# Patient Record
Sex: MALE
[De-identification: names, ages, dates, MRNs, and addresses within clinical notes are randomized per-mention and may not be internally consistent; named-entity substitution may affect disease eponyms.]

---

## 2019-01-16 PROBLEM — Z00.00 ENCOUNTER FOR PREVENTIVE HEALTH EXAMINATION: Status: ACTIVE | Noted: 2019-01-16

## 2019-01-25 ENCOUNTER — APPOINTMENT (OUTPATIENT)
Dept: PULMONOLOGY | Facility: CLINIC | Age: 59
End: 2019-01-25
Payer: COMMERCIAL

## 2019-01-25 VITALS
SYSTOLIC BLOOD PRESSURE: 120 MMHG | BODY MASS INDEX: 30.35 KG/M2 | DIASTOLIC BLOOD PRESSURE: 64 MMHG | TEMPERATURE: 97.8 F | HEIGHT: 70 IN | HEART RATE: 76 BPM | OXYGEN SATURATION: 97 % | WEIGHT: 212 LBS

## 2019-01-25 DIAGNOSIS — Z82.49 FAMILY HISTORY OF ISCHEMIC HEART DISEASE AND OTHER DISEASES OF THE CIRCULATORY SYSTEM: ICD-10-CM

## 2019-01-25 DIAGNOSIS — R05 COUGH: ICD-10-CM

## 2019-01-25 DIAGNOSIS — J45.909 UNSPECIFIED ASTHMA, UNCOMPLICATED: ICD-10-CM

## 2019-01-25 PROCEDURE — 99244 OFF/OP CNSLTJ NEW/EST MOD 40: CPT

## 2019-01-27 PROBLEM — Z82.49 FAMILY HISTORY OF CONGESTIVE HEART FAILURE: Status: ACTIVE | Noted: 2019-01-25

## 2019-01-27 PROBLEM — J45.909 CHILDHOOD ASTHMA: Status: RESOLVED | Noted: 2019-01-25 | Resolved: 2019-01-27

## 2019-01-27 NOTE — REVIEW OF SYSTEMS
[Postnasal Drip] : postnasal drip [Cough] : cough [Reflux] : reflux [Snoring] : snoring [Fever] : no fever [Chills] : no chills [Dry Eyes] : no dryness of the eyes [Eye Irritation] : no ~T irritation of the eyes [Nasal Congestion] : no nasal congestion [Sinus Problems] : no sinus problems [Sore Throat] : no sore throat [Sputum] : not coughing up ~M sputum [Hemoptysis] : no hemoptysis [Dyspnea] : no dyspnea [Chest Tightness] : no chest tightness [Pleuritic Pain] : no pleuritic pain [Frequent URIs] : no frequent upper respiratory infections [Wheezing] : no wheezing [Hay Fever] : no hay fever [Itchy Eyes] : no itching of ~T the eyes [Watery Eyes] : no discharge from the eyes [Nasal Discharge] : no nasal discharge [Heartburn] : no heartburn [Indigestion] : no indigestion [Nocturia] : no nocturia [Frequency] : no change in urinary frequency [Trauma] : no ~T physical trauma [Fracture] : no fracture [Rash] : no [unfilled] rash [Itch] : no itching [Anemia] : no anemia [Blood Transfusion] : no blood transfusion [Headache] : no headache [Dizziness] : no dizziness [Syncope] : no fainting [Depression] : no depression [Anxiety] : no anxiety [Diabetes] : no diabetes mellitus [Thyroid Problem] : no thyroid problem [Witnessed Apneas] : demonstrated no ~M apnea [Awakes With Headache] : awakes without a headache [Awakes With Dry Mouth] : awakes without dry mouth

## 2019-01-27 NOTE — CONSULT LETTER
[Dear  ___] : Dear  [unfilled], [Consult Letter:] : I had the pleasure of evaluating your patient, [unfilled]. [Please see my note below.] : Please see my note below. [Consult Closing:] : Thank you very much for allowing me to participate in the care of this patient.  If you have any questions, please do not hesitate to contact me. [FreeTextEntry3] : Sincerely\par \par Armando Rivers MD FCCP\par Pulmonary and Critical Care\par [facility]\par

## 2019-01-27 NOTE — PHYSICAL EXAM
[General Appearance - Well Developed] : well developed [General Appearance - Well Nourished] : well nourished [Normal Conjunctiva] : the conjunctiva exhibited no abnormalities [Eyelids - No Xanthelasma] : the eyelids demonstrated no xanthelasmas [Erythema] : erythema of the pharynx [Neck Cervical Mass (___cm)] : no neck mass was observed [Jugular Venous Distention Increased] : there was no jugular-venous distention [Heart Sounds] : normal S1 and S2 [Arterial Pulses Normal] : the arterial pulses were normal [Respiration, Rhythm And Depth] : normal respiratory rhythm and effort [Auscultation Breath Sounds / Voice Sounds] : lungs were clear to auscultation bilaterally [Bowel Sounds] : normal bowel sounds [Abdomen Soft] : soft [Abnormal Walk] : normal gait [Gait - Sufficient For Exercise Testing] : the gait was sufficient for exercise testing [Nail Clubbing] : no clubbing of the fingernails [Cyanosis, Localized] : no localized cyanosis [] : no rash [Skin Lesions] : no skin lesions [Sensation] : the sensory exam was normal to light touch and pinprick [No Focal Deficits] : no focal deficits [Oriented To Time, Place, And Person] : oriented to person, place, and time [Mood] : the mood was normal [Low Lying Soft Palate] : no low lying soft palate

## 2019-01-27 NOTE — DISCUSSION/SUMMARY
[FreeTextEntry1] : 58 year old male with history of childhood asthma and mild intermittent asthma at present referred  to clinic by Dr. Apolonia Philippe with cough for 2 months.\par \par Saturation 97% on RA\par \par A/P\par History is suggestive of dry cough secondary to asthma. Patient also has GERD symptoms along with rhinitis. His symptoms improved but still persisting.\par Plan:\par - Continue Symbicort 2 puff bid with gargle\par - He took Singulair in past but currently, he is not using it. I restarted Singulair for 1 month.\par - Add Flonase\par - Continue omeprazole\par - Follow up after 4 weeks.

## 2019-01-27 NOTE — HISTORY OF PRESENT ILLNESS
[FreeTextEntry1] : 58 year old male with history of childhood asthma and mild intermittent asthma at present referred  to clinic by Dr. Apolonia Philippe with cough for 2 months. Patient started to have cough 2 month back, which is persistent, predominantly dry, increased with exposure to cold air. Improved after using omeprazole and Symbicort for last few days but still persisting. Patient c/o occasional symptoms of GERD and wheezing. Denies fever, sore throat, chest pain or SOB. According to patient, CXR was done by Dr. Philippe and it was clear. He denies any sick contact or travel outside country. No Pets at home. He works in restaurant business.

## 2019-03-01 ENCOUNTER — APPOINTMENT (OUTPATIENT)
Dept: PULMONOLOGY | Facility: CLINIC | Age: 59
End: 2019-03-01
Payer: COMMERCIAL

## 2019-03-01 VITALS
HEART RATE: 73 BPM | WEIGHT: 203 LBS | SYSTOLIC BLOOD PRESSURE: 110 MMHG | BODY MASS INDEX: 29.06 KG/M2 | HEIGHT: 70 IN | OXYGEN SATURATION: 96 % | TEMPERATURE: 97.4 F | DIASTOLIC BLOOD PRESSURE: 80 MMHG

## 2019-03-01 PROCEDURE — 99214 OFFICE O/P EST MOD 30 MIN: CPT

## 2019-03-01 RX ORDER — OMEPRAZOLE 40 MG/1
40 CAPSULE, DELAYED RELEASE ORAL
Refills: 0 | Status: DISCONTINUED | COMMUNITY
End: 2019-03-01

## 2019-03-01 RX ORDER — BUDESONIDE AND FORMOTEROL FUMARATE DIHYDRATE 160; 4.5 UG/1; UG/1
160-4.5 AEROSOL RESPIRATORY (INHALATION)
Refills: 0 | Status: DISCONTINUED | COMMUNITY
End: 2019-03-01

## 2019-03-01 RX ORDER — SODIUM BICARBONATE, SODIUM CHLORIDE 700; 2300 MG/3G; MG/3G
2300-700 POWDER, FOR SOLUTION NASAL
Qty: 1 | Refills: 0 | Status: ACTIVE | COMMUNITY
Start: 2019-03-01 | End: 1900-01-01

## 2019-03-01 NOTE — REVIEW OF SYSTEMS
[Postnasal Drip] : postnasal drip [Cough] : cough [Reflux] : reflux [Snoring] : snoring [Fever] : no fever [Chills] : no chills [Dry Eyes] : no dryness of the eyes [Eye Irritation] : no ~T irritation of the eyes [Nasal Congestion] : no nasal congestion [Sinus Problems] : no sinus problems [Sore Throat] : no sore throat [Sputum] : not coughing up ~M sputum [Hemoptysis] : no hemoptysis [Dyspnea] : no dyspnea [Chest Tightness] : no chest tightness [Pleuritic Pain] : no pleuritic pain [Frequent URIs] : no frequent upper respiratory infections [Wheezing] : no wheezing [Hay Fever] : no hay fever [Itchy Eyes] : no itching of ~T the eyes [Watery Eyes] : no discharge from the eyes [Nasal Discharge] : no nasal discharge [Heartburn] : no heartburn [Indigestion] : no indigestion [Nocturia] : no nocturia [Frequency] : no change in urinary frequency [Trauma] : no ~T physical trauma [Fracture] : no fracture [Rash] : no [unfilled] rash [Itch] : no itching [Anemia] : no anemia [Blood Transfusion] : no blood transfusion [Headache] : no headache [Dizziness] : no dizziness [Syncope] : no fainting [Depression] : no depression [Anxiety] : no anxiety [Diabetes] : no diabetes mellitus [Thyroid Problem] : no thyroid problem [Witnessed Apneas] : demonstrated no ~M apnea [Awakes With Headache] : awakes without a headache [Awakes With Dry Mouth] : awakes without dry mouth [FreeTextEntry8] : improved.

## 2019-03-01 NOTE — PHYSICAL EXAM
[General Appearance - Well Developed] : well developed [General Appearance - Well Nourished] : well nourished [Normal Conjunctiva] : the conjunctiva exhibited no abnormalities [Eyelids - No Xanthelasma] : the eyelids demonstrated no xanthelasmas [Erythema] : erythema of the pharynx [Neck Cervical Mass (___cm)] : no neck mass was observed [Jugular Venous Distention Increased] : there was no jugular-venous distention [Heart Sounds] : normal S1 and S2 [Arterial Pulses Normal] : the arterial pulses were normal [Respiration, Rhythm And Depth] : normal respiratory rhythm and effort [Auscultation Breath Sounds / Voice Sounds] : lungs were clear to auscultation bilaterally [Bowel Sounds] : normal bowel sounds [Abdomen Soft] : soft [Abnormal Walk] : normal gait [Gait - Sufficient For Exercise Testing] : the gait was sufficient for exercise testing [Nail Clubbing] : no clubbing of the fingernails [Cyanosis, Localized] : no localized cyanosis [Sensation] : the sensory exam was normal to light touch and pinprick [No Focal Deficits] : no focal deficits [Oriented To Time, Place, And Person] : oriented to person, place, and time [Mood] : the mood was normal [] : no rash [Skin Lesions] : no skin lesions [Low Lying Soft Palate] : no low lying soft palate [FreeTextEntry1] : nasal mucosa erythematous and Deviated nasal septum

## 2019-03-01 NOTE — HISTORY OF PRESENT ILLNESS
[FreeTextEntry1] : 58 year old male with history of childhood asthma and mild intermittent asthma at present referred  to clinic by Dr. Apolonia Philippe with cough for 2 months. Patient started to have cough 2 month back, which is persistent, predominantly dry, increased with exposure to cold air. Improved after using omeprazole and Symbicort for last few days but still persisting. Patient c/o occasional symptoms of GERD and wheezing. Denies fever, sore throat, chest pain or SOB. According to patient, CXR was done by Dr. Philippe and it was clear. He denies any sick contact or travel outside country. No Pets at home. He works in restaurant business.\par \par \par [3/1/19]\par Patient presents for a follow-up visit. Today, patient reports feeling better. He says that his cough is better but he still has dry persistent cough.

## 2019-04-12 ENCOUNTER — APPOINTMENT (OUTPATIENT)
Dept: PULMONOLOGY | Facility: CLINIC | Age: 59
End: 2019-04-12
Payer: COMMERCIAL

## 2019-04-12 VITALS
RESPIRATION RATE: 12 BRPM | DIASTOLIC BLOOD PRESSURE: 80 MMHG | OXYGEN SATURATION: 97 % | HEART RATE: 79 BPM | BODY MASS INDEX: 29.78 KG/M2 | SYSTOLIC BLOOD PRESSURE: 110 MMHG | WEIGHT: 208 LBS | TEMPERATURE: 98.7 F | HEIGHT: 70 IN

## 2019-04-12 PROCEDURE — 99214 OFFICE O/P EST MOD 30 MIN: CPT

## 2019-04-12 NOTE — DISCUSSION/SUMMARY
[FreeTextEntry1] : 58 year old male with history of childhood asthma and mild intermittent asthma with allergic rhinitis. His cough has improved significantly.\par \par A/P\par Asthma is stable and mild intermittent. Cough due to allergic rhinitis has resolved\par Plan:\par - ProAir as needed for asthma\par - d/c Singulair\par - Continue Flonase, azelastine and nasal irrigation two times a day\par - Follow up after 3 months

## 2019-04-12 NOTE — REVIEW OF SYSTEMS
[Snoring] : snoring [Fever] : no fever [Chills] : no chills [Nasal Congestion] : no nasal congestion [Dry Eyes] : no dryness of the eyes [Eye Irritation] : no ~T irritation of the eyes [Postnasal Drip] : no postnasal drip [Sore Throat] : no sore throat [Sinus Problems] : no sinus problems [Cough] : no cough [Sputum] : not coughing up ~M sputum [Hemoptysis] : no hemoptysis [Dyspnea] : no dyspnea [Frequent URIs] : no frequent upper respiratory infections [Chest Tightness] : no chest tightness [Pleuritic Pain] : no pleuritic pain [Wheezing] : no wheezing [Hay Fever] : no hay fever [Itchy Eyes] : no itching of ~T the eyes [Watery Eyes] : no discharge from the eyes [Nasal Discharge] : no nasal discharge [Heartburn] : no heartburn [Reflux] : no reflux [Indigestion] : no indigestion [Nocturia] : no nocturia [Frequency] : no change in urinary frequency [Fracture] : no fracture [Trauma] : no ~T physical trauma [Rash] : no [unfilled] rash [Itch] : no itching [Anemia] : no anemia [Headache] : no headache [Dizziness] : no dizziness [Blood Transfusion] : no blood transfusion [Anxiety] : no anxiety [Syncope] : no fainting [Depression] : no depression [Thyroid Problem] : no thyroid problem [Diabetes] : no diabetes mellitus [Witnessed Apneas] : demonstrated no ~M apnea [Awakes With Headache] : awakes without a headache [Awakes With Dry Mouth] : awakes without dry mouth [FreeTextEntry8] : improved.

## 2019-04-12 NOTE — PHYSICAL EXAM
[General Appearance - Well Developed] : well developed [General Appearance - Well Nourished] : well nourished [Normal Conjunctiva] : the conjunctiva exhibited no abnormalities [Eyelids - No Xanthelasma] : the eyelids demonstrated no xanthelasmas [Erythema] : erythema of the pharynx [Neck Cervical Mass (___cm)] : no neck mass was observed [Arterial Pulses Normal] : the arterial pulses were normal [Jugular Venous Distention Increased] : there was no jugular-venous distention [Heart Sounds] : normal S1 and S2 [Respiration, Rhythm And Depth] : normal respiratory rhythm and effort [Auscultation Breath Sounds / Voice Sounds] : lungs were clear to auscultation bilaterally [Abnormal Walk] : normal gait [Bowel Sounds] : normal bowel sounds [Abdomen Soft] : soft [Cyanosis, Localized] : no localized cyanosis [Nail Clubbing] : no clubbing of the fingernails [Gait - Sufficient For Exercise Testing] : the gait was sufficient for exercise testing [Sensation] : the sensory exam was normal to light touch and pinprick [No Focal Deficits] : no focal deficits [Oriented To Time, Place, And Person] : oriented to person, place, and time [] : no rash [Skin Lesions] : no skin lesions [Low Lying Soft Palate] : no low lying soft palate [FreeTextEntry1] : nasal mucosa erythematous and Deviated nasal septum. Improvement in edematous nasal mucosa. [Affect] : the affect was normal

## 2019-07-05 ENCOUNTER — RX RENEWAL (OUTPATIENT)
Age: 59
End: 2019-07-05

## 2019-07-07 ENCOUNTER — MOBILE ON CALL (OUTPATIENT)
Age: 59
End: 2019-07-07

## 2019-07-08 ENCOUNTER — RX RENEWAL (OUTPATIENT)
Age: 59
End: 2019-07-08

## 2019-08-02 ENCOUNTER — APPOINTMENT (OUTPATIENT)
Dept: PULMONOLOGY | Facility: CLINIC | Age: 59
End: 2019-08-02
Payer: COMMERCIAL

## 2019-08-02 VITALS
BODY MASS INDEX: 29.49 KG/M2 | TEMPERATURE: 98.7 F | RESPIRATION RATE: 12 BRPM | OXYGEN SATURATION: 96 % | SYSTOLIC BLOOD PRESSURE: 120 MMHG | WEIGHT: 206 LBS | HEIGHT: 70 IN | HEART RATE: 86 BPM | DIASTOLIC BLOOD PRESSURE: 80 MMHG

## 2019-08-02 DIAGNOSIS — J45.909 UNSPECIFIED ASTHMA, UNCOMPLICATED: ICD-10-CM

## 2019-08-02 DIAGNOSIS — J30.9 ALLERGIC RHINITIS, UNSPECIFIED: ICD-10-CM

## 2019-08-02 PROCEDURE — 99214 OFFICE O/P EST MOD 30 MIN: CPT

## 2019-08-02 RX ORDER — MONTELUKAST 10 MG/1
10 TABLET, FILM COATED ORAL
Qty: 30 | Refills: 2 | Status: DISCONTINUED | COMMUNITY
Start: 2019-01-25 | End: 2019-08-02

## 2019-08-02 RX ORDER — AZELASTINE HYDROCHLORIDE 137 UG/1
0.1 SPRAY, METERED NASAL TWICE DAILY
Qty: 1 | Refills: 2 | Status: DISCONTINUED | COMMUNITY
Start: 2019-03-01 | End: 2019-08-02

## 2019-08-02 NOTE — DISCUSSION/SUMMARY
[FreeTextEntry1] : 58 year old male with history of childhood asthma and mild intermittent asthma with allergic rhinitis.\par \par Saturation 96% on RA\par \par A/P\par Mild intermittent asthma with allergic rhinitis\par Plan:\par - Continue prn albuterol for asthma (only occasional use since last visit)\par - Continue nasal irrigation and Flonase\par - D/c azelastine nasal spray\par - Add Flonase\par - Follow up after 6 months

## 2019-08-02 NOTE — PHYSICAL EXAM
[General Appearance - Well Developed] : well developed [General Appearance - Well Nourished] : well nourished [Normal Conjunctiva] : the conjunctiva exhibited no abnormalities [Eyelids - No Xanthelasma] : the eyelids demonstrated no xanthelasmas [Erythema] : erythema of the pharynx [Neck Cervical Mass (___cm)] : no neck mass was observed [Jugular Venous Distention Increased] : there was no jugular-venous distention [Arterial Pulses Normal] : the arterial pulses were normal [Heart Sounds] : normal S1 and S2 [] : no respiratory distress [Respiration, Rhythm And Depth] : normal respiratory rhythm and effort [Auscultation Breath Sounds / Voice Sounds] : lungs were clear to auscultation bilaterally [Abdomen Soft] : soft [Bowel Sounds] : normal bowel sounds [Gait - Sufficient For Exercise Testing] : the gait was sufficient for exercise testing [Nail Clubbing] : no clubbing of the fingernails [Abnormal Walk] : normal gait [Cyanosis, Localized] : no localized cyanosis [Sensation] : the sensory exam was normal to light touch and pinprick [No Focal Deficits] : no focal deficits [Oriented To Time, Place, And Person] : oriented to person, place, and time [Affect] : the affect was normal [Low Lying Soft Palate] : no low lying soft palate [FreeTextEntry1] : nasal mucosa erythematous and Deviated nasal septum. Improvement in edematous and erythema of nasal mucosa.

## 2019-08-02 NOTE — HISTORY OF PRESENT ILLNESS
[FreeTextEntry1] : 58 year old male with history of childhood asthma and mild intermittent asthma at present referred  to clinic by Dr. Apolonia Philippe with cough for 2 months. Patient started to have cough 2 month back, which is persistent, predominantly dry, increased with exposure to cold air. Improved after using omeprazole and Symbicort for last few days but still persisting. Patient c/o occasional symptoms of GERD and wheezing. Denies fever, sore throat, chest pain or SOB. According to patient, CXR was done by Dr. Philippe and it was clear. He denies any sick contact or travel outside country. No Pets at home. He works in restaurant business.\par \par \par [3/1/19]\par Patient presents for a follow-up visit. Today, patient reports feeling better. He says that his cough is better but he still has dry persistent cough.\par \par [4/11/19]\par Patient presents for a follow-up visit. Today, patient reports feeling much better. He reports complete resolution of his cough. Denies nasal congestion.\par \par 8/2/19:\par Significant improvement is symptoms. No cough or nasal congestion. Occasional throat clearing. No SOB. Complaint with nasal irrigation and nasal spray.

## 2019-08-02 NOTE — REVIEW OF SYSTEMS
[Snoring] : snoring [Fever] : no fever [Chills] : no chills [Dry Eyes] : no dryness of the eyes [Nasal Congestion] : no nasal congestion [Eye Irritation] : no ~T irritation of the eyes [Postnasal Drip] : no postnasal drip [Sore Throat] : no sore throat [Sinus Problems] : no sinus problems [Sputum] : not coughing up ~M sputum [Cough] : no cough [Hemoptysis] : no hemoptysis [Chest Tightness] : no chest tightness [Dyspnea] : no dyspnea [Frequent URIs] : no frequent upper respiratory infections [Pleuritic Pain] : no pleuritic pain [Wheezing] : no wheezing [Hay Fever] : no hay fever [Itchy Eyes] : no itching of ~T the eyes [Watery Eyes] : no discharge from the eyes [Heartburn] : no heartburn [Nasal Discharge] : no nasal discharge [Indigestion] : no indigestion [Reflux] : no reflux [Nocturia] : no nocturia [Frequency] : no change in urinary frequency [Trauma] : no ~T physical trauma [Rash] : no [unfilled] rash [Itch] : no itching [Fracture] : no fracture [Anemia] : no anemia [Blood Transfusion] : no blood transfusion [Headache] : no headache [Dizziness] : no dizziness [Syncope] : no fainting [Anxiety] : no anxiety [Depression] : no depression [Diabetes] : no diabetes mellitus [Thyroid Problem] : no thyroid problem [Witnessed Apneas] : demonstrated no ~M apnea [Awakes With Headache] : awakes without a headache [Awakes With Dry Mouth] : awakes without dry mouth [FreeTextEntry8] : occasional throat clearing

## 2019-08-27 RX ORDER — FLUTICASONE PROPIONATE 50 UG/1
50 SPRAY, METERED NASAL TWICE DAILY
Qty: 1 | Refills: 2 | Status: ACTIVE | COMMUNITY
Start: 2019-01-25 | End: 1900-01-01

## 2019-12-27 ENCOUNTER — APPOINTMENT (OUTPATIENT)
Dept: PULMONOLOGY | Facility: CLINIC | Age: 59
End: 2019-12-27

## 2020-07-16 RX ORDER — ALBUTEROL SULFATE 90 UG/1
108 (90 BASE) INHALANT RESPIRATORY (INHALATION)
Qty: 1 | Refills: 3 | Status: ACTIVE | COMMUNITY
Start: 2019-03-01 | End: 1900-01-01

## 2023-05-25 ENCOUNTER — APPOINTMENT (OUTPATIENT)
Dept: PULMONOLOGY | Facility: CLINIC | Age: 63
End: 2023-05-25
Payer: COMMERCIAL

## 2023-05-25 VITALS — OXYGEN SATURATION: 98 % | HEART RATE: 84 BPM

## 2023-05-25 PROCEDURE — 99204 OFFICE O/P NEW MOD 45 MIN: CPT | Mod: 25

## 2023-05-25 PROCEDURE — 94729 DIFFUSING CAPACITY: CPT

## 2023-05-25 PROCEDURE — 71046 X-RAY EXAM CHEST 2 VIEWS: CPT

## 2023-05-25 PROCEDURE — 95012 NITRIC OXIDE EXP GAS DETER: CPT

## 2023-05-25 PROCEDURE — 94727 GAS DIL/WSHOT DETER LNG VOL: CPT

## 2023-05-25 PROCEDURE — 94060 EVALUATION OF WHEEZING: CPT

## 2023-05-25 RX ORDER — EZETIMIBE AND SIMVASTATIN 10; 10 MG/1; MG/1
10-10 TABLET ORAL DAILY
Refills: 0 | Status: ACTIVE | COMMUNITY
Start: 2023-05-25

## 2023-05-25 NOTE — ASSESSMENT
[FreeTextEntry1] : Data reviewed:\par \par PA/lat CXR [facility] 2019: normal\par PA/lat CXR in office 5/25/2023: no sig change from 2019, no new infiltrate, some bronchial wall thickening on L\par \par PFT 05/25/2023 : normal study w FEV1/FVC at [facility], no BD response / FENO 22 (on Flovent)\par \par Impression:\par Acute on chronic cough following recent Covid\par \par Plan:\par He certainly could have asthma but there is no evidence for this today, on Flovent at unknown dose for past week. Pragmatically, I advised him to take Flovent till hopefully his symptoms resolve. If symptoms never resolve, he should come back. If symptoms resolve, he can stop Flovent, but then if symptoms recur he should come back without being on Flovent so he can get evaluated more definitively for asthma.

## 2023-05-25 NOTE — HISTORY OF PRESENT ILLNESS
[TextBox_4] : 05/25/2023: Asked to evaluate patient by Dr Armando for cough. Here w wife and daughter. Seen by Dr Rivers previously in 2019 for mild intermittent asthma and allergic rhinitis, treated w albuterol and nasal sprays. In March he had a bronchitis and was given Z pack x 2. Since then he has been coughing sputum. He then had Covid 2 weeks ago for the 3rd time. He was given molnupiravir and a Z pack which he has just finished. He's much better than 2 weeks ago. However he is concerned because he is always coughing. Usually dry. Dr Armando gave him albuterol but he is not using it. However he is on Flovent for the past 2 months intermittently and every day for the past week. Did have childhood asthma. Never smoked. 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 He has taken steroid bursts before, last in March. Takes steroids regularly, 1-2x a year.\par

## 2023-05-25 NOTE — PHYSICAL EXAM
[No Acute Distress] : no acute distress [Normal Rate/Rhythm] : normal rate/rhythm [Normal S1, S2] : normal s1, s2 [No Murmurs] : no murmurs [TextBox_68] : mildly rhonchorous

## 2023-05-25 NOTE — CONSULT LETTER
[Dear  ___] : Dear  [unfilled], [( Thank you for referring [unfilled] for consultation for _____ )] : Thank you for referring [unfilled] for consultation for [unfilled] [Please see my note below.] : Please see my note below. [Consult Closing:] : Thank you very much for allowing me to participate in the care of this patient.  If you have any questions, please do not hesitate to contact me. [Sincerely,] : Sincerely, [FreeTextEntry2] : Bell Armando MD\par [address]\[place] [address]\par  [FreeTextEntry3] : Siobhan Quigley MD, FCCP\par